# Patient Record
Sex: MALE | Race: BLACK OR AFRICAN AMERICAN | Employment: FULL TIME | URBAN - METROPOLITAN AREA
[De-identification: names, ages, dates, MRNs, and addresses within clinical notes are randomized per-mention and may not be internally consistent; named-entity substitution may affect disease eponyms.]

---

## 2022-04-05 ENCOUNTER — HOSPITAL ENCOUNTER (EMERGENCY)
Age: 37
Discharge: HOME | End: 2022-04-05
Attending: EMERGENCY MEDICINE
Payer: MEDICAID

## 2022-04-05 VITALS
HEART RATE: 98 BPM | HEIGHT: 66 IN | BODY MASS INDEX: 30.53 KG/M2 | DIASTOLIC BLOOD PRESSURE: 80 MMHG | RESPIRATION RATE: 16 BRPM | WEIGHT: 190 LBS | OXYGEN SATURATION: 98 % | TEMPERATURE: 97.5 F | SYSTOLIC BLOOD PRESSURE: 130 MMHG

## 2022-04-05 DIAGNOSIS — M43.6 TORTICOLLIS: Primary | ICD-10-CM

## 2022-04-05 PROCEDURE — 99283 EMERGENCY DEPT VISIT LOW MDM: CPT

## 2022-04-05 ASSESSMENT — ENCOUNTER SYMPTOMS
DIARRHEA: 0
WHEEZING: 0
EYE DISCHARGE: 0
ABDOMINAL PAIN: 0
NAUSEA: 0
SORE THROAT: 0
SHORTNESS OF BREATH: 0
SINUS PRESSURE: 0
EYE REDNESS: 0
VOMITING: 0
BACK PAIN: 0
COUGH: 0
EYE PAIN: 0

## 2022-04-05 NOTE — Clinical Note
Janny Goetz was seen and treated in our emergency department on 4/5/2022. He may return to work on 04/06/2022. If you have any questions or concerns, please don't hesitate to call.       Fernanda Allison, DO

## 2022-04-05 NOTE — Clinical Note
Deepthi Tay was seen and treated in our emergency department on 4/5/2022. He may return to work on 04/06/2022. If you have any questions or concerns, please don't hesitate to call.       Felix Lino, DO

## 2022-04-06 NOTE — ED PROVIDER NOTES
Sleeping with fan and window, waking up with stiff neck in am; no trauma    The history is provided by the patient. Neck Pain  Pain location:  R side  Quality:  Burning  Pain radiates to:  Does not radiate  Pain severity:  Mild  Pain is:  Unable to specify  Onset quality:  Gradual  Timing:  Intermittent  Progression:  Waxing and waning  Chronicity:  Recurrent  Relieved by:  Nothing  Worsened by:  Position and twisting  Ineffective treatments:  None tried  Associated symptoms: no chest pain, no fever, no headaches and no weakness         Review of Systems   Constitutional: Negative for chills and fever. HENT: Negative for ear pain, sinus pressure and sore throat. Eyes: Negative for pain, discharge and redness. Respiratory: Negative for cough, shortness of breath and wheezing. Cardiovascular: Negative for chest pain. Gastrointestinal: Negative for abdominal pain, diarrhea, nausea and vomiting. Genitourinary: Negative for dysuria and frequency. Musculoskeletal: Positive for myalgias and neck pain. Negative for arthralgias and back pain. Skin: Negative for rash and wound. Neurological: Negative for weakness and headaches. Hematological: Negative for adenopathy. Psychiatric/Behavioral: Negative. All other systems reviewed and are negative. Physical Exam  Vitals and nursing note reviewed. Constitutional:       Appearance: He is well-developed. HENT:      Head: Normocephalic and atraumatic. Eyes:      Pupils: Pupils are equal, round, and reactive to light. Cardiovascular:      Rate and Rhythm: Normal rate and regular rhythm. Heart sounds: Normal heart sounds. No murmur heard. Pulmonary:      Effort: Pulmonary effort is normal.      Breath sounds: Normal breath sounds. Abdominal:      General: Bowel sounds are normal.      Palpations: Abdomen is soft. Tenderness: There is no abdominal tenderness. There is no guarding or rebound.    Musculoskeletal:      Cervical back: Neck supple. Spasms, torticollis and tenderness present. Decreased range of motion. Skin:     General: Skin is warm and dry. Neurological:      Mental Status: He is alert and oriented to person, place, and time. Psychiatric:         Behavior: Behavior normal.         Thought Content: Thought content normal.         Judgment: Judgment normal.        --------------------------------------------- PAST HISTORY ---------------------------------------------  Past Medical History:  has no past medical history on file. Past Surgical History:  has no past surgical history on file. Social History:  reports that he has been smoking cigars. He does not have any smokeless tobacco history on file. He reports current alcohol use. He reports that he does not use drugs. Family History: family history is not on file. The patients home medications have been reviewed. Allergies: Patient has no known allergies. -------------------------------------------------- RESULTS -------------------------------------------------  No results found for this visit on 04/05/22. No orders to display       ------------------------- NURSING NOTES AND VITALS REVIEWED ---------------------------   The nursing notes within the ED encounter and vital signs as below have been reviewed. /80   Pulse 98   Temp 97.5 °F (36.4 °C) (Temporal)   Resp 16   Ht 5' 6\" (1.676 m)   Wt 190 lb (86.2 kg)   SpO2 98%   BMI 30.67 kg/m²   Oxygen Saturation Interpretation: Normal      ------------------------------------------ PROGRESS NOTES ------------------------------------------   I have spoken with the patient and discussed todays results, in addition to providing specific details for the plan of care and counseling regarding the diagnosis and prognosis.   Their questions are answered at this time and they are agreeable with the plan.      --------------------------------- ADDITIONAL PROVIDER NOTES ---------------------------------        This patient is stable for discharge. I have shared the specific conditions for return, as well as the importance of follow-up. IMPRESSION:     1.  Torticollis      Discharge Medication List as of 4/5/2022  8:24 PM      STOP taking these medications       oxyCODONE-acetaminophen (PERCOCET) 5-325 MG per tablet Comments:   Reason for Stopping:                 Procedures     MDM                   Colby Ospina DO  04/05/22 2027

## 2022-10-03 ENCOUNTER — APPOINTMENT (OUTPATIENT)
Dept: GENERAL RADIOLOGY | Age: 37
End: 2022-10-03
Payer: MEDICAID

## 2022-10-03 ENCOUNTER — APPOINTMENT (OUTPATIENT)
Dept: ULTRASOUND IMAGING | Age: 37
End: 2022-10-03
Payer: MEDICAID

## 2022-10-03 ENCOUNTER — HOSPITAL ENCOUNTER (EMERGENCY)
Age: 37
Discharge: HOME | End: 2022-10-03
Attending: EMERGENCY MEDICINE
Payer: MEDICAID

## 2022-10-03 VITALS
DIASTOLIC BLOOD PRESSURE: 86 MMHG | RESPIRATION RATE: 14 BRPM | HEART RATE: 86 BPM | OXYGEN SATURATION: 99 % | TEMPERATURE: 97.3 F | SYSTOLIC BLOOD PRESSURE: 126 MMHG

## 2022-10-03 DIAGNOSIS — M79.5: ICD-10-CM

## 2022-10-03 DIAGNOSIS — M79.89 LEG SWELLING: Primary | ICD-10-CM

## 2022-10-03 DIAGNOSIS — L03.116 CELLULITIS OF LEFT LEG: ICD-10-CM

## 2022-10-03 LAB
ANION GAP SERPL CALCULATED.3IONS-SCNC: 9 MMOL/L (ref 7–16)
BASOPHILS ABSOLUTE: 0.03 E9/L (ref 0–0.2)
BASOPHILS RELATIVE PERCENT: 0.5 % (ref 0–2)
BUN BLDV-MCNC: 13 MG/DL (ref 6–20)
CALCIUM SERPL-MCNC: 9.5 MG/DL (ref 8.6–10.2)
CHLORIDE BLD-SCNC: 104 MMOL/L (ref 98–107)
CO2: 27 MMOL/L (ref 22–29)
CREAT SERPL-MCNC: 1 MG/DL (ref 0.7–1.2)
EOSINOPHILS ABSOLUTE: 0.04 E9/L (ref 0.05–0.5)
EOSINOPHILS RELATIVE PERCENT: 0.7 % (ref 0–6)
GFR AFRICAN AMERICAN: >60
GFR NON-AFRICAN AMERICAN: >60 ML/MIN/1.73
GLUCOSE BLD-MCNC: 87 MG/DL (ref 74–99)
HCT VFR BLD CALC: 41.9 % (ref 37–54)
HEMOGLOBIN: 14 G/DL (ref 12.5–16.5)
IMMATURE GRANULOCYTES #: 0.01 E9/L
IMMATURE GRANULOCYTES %: 0.2 % (ref 0–5)
LYMPHOCYTES ABSOLUTE: 1.99 E9/L (ref 1.5–4)
LYMPHOCYTES RELATIVE PERCENT: 34 % (ref 20–42)
MCH RBC QN AUTO: 28.5 PG (ref 26–35)
MCHC RBC AUTO-ENTMCNC: 33.4 % (ref 32–34.5)
MCV RBC AUTO: 85.2 FL (ref 80–99.9)
MONOCYTES ABSOLUTE: 0.64 E9/L (ref 0.1–0.95)
MONOCYTES RELATIVE PERCENT: 10.9 % (ref 2–12)
NEUTROPHILS ABSOLUTE: 3.14 E9/L (ref 1.8–7.3)
NEUTROPHILS RELATIVE PERCENT: 53.7 % (ref 43–80)
PDW BLD-RTO: 14.6 FL (ref 11.5–15)
PLATELET # BLD: 243 E9/L (ref 130–450)
PMV BLD AUTO: 10.6 FL (ref 7–12)
POTASSIUM REFLEX MAGNESIUM: 4.3 MMOL/L (ref 3.5–5)
RBC # BLD: 4.92 E12/L (ref 3.8–5.8)
SODIUM BLD-SCNC: 140 MMOL/L (ref 132–146)
URIC ACID, SERUM: 6.7 MG/DL (ref 3.4–7)
WBC # BLD: 5.9 E9/L (ref 4.5–11.5)

## 2022-10-03 PROCEDURE — 85025 COMPLETE CBC W/AUTO DIFF WBC: CPT

## 2022-10-03 PROCEDURE — 99284 EMERGENCY DEPT VISIT MOD MDM: CPT

## 2022-10-03 PROCEDURE — 80048 BASIC METABOLIC PNL TOTAL CA: CPT

## 2022-10-03 PROCEDURE — 6370000000 HC RX 637 (ALT 250 FOR IP): Performed by: PHYSICIAN ASSISTANT

## 2022-10-03 PROCEDURE — 84550 ASSAY OF BLOOD/URIC ACID: CPT

## 2022-10-03 PROCEDURE — 73590 X-RAY EXAM OF LOWER LEG: CPT

## 2022-10-03 PROCEDURE — 93971 EXTREMITY STUDY: CPT

## 2022-10-03 RX ORDER — IBUPROFEN 800 MG/1
800 TABLET ORAL EVERY 8 HOURS PRN
Qty: 21 TABLET | Refills: 0 | Status: SHIPPED | OUTPATIENT
Start: 2022-10-03 | End: 2022-10-10

## 2022-10-03 RX ORDER — CEPHALEXIN 250 MG/1
500 CAPSULE ORAL ONCE
Status: COMPLETED | OUTPATIENT
Start: 2022-10-03 | End: 2022-10-03

## 2022-10-03 RX ORDER — CEPHALEXIN 500 MG/1
500 CAPSULE ORAL 4 TIMES DAILY
Qty: 40 CAPSULE | Refills: 0 | Status: SHIPPED | OUTPATIENT
Start: 2022-10-03 | End: 2022-10-13

## 2022-10-03 RX ORDER — IBUPROFEN 800 MG/1
800 TABLET ORAL ONCE
Status: COMPLETED | OUTPATIENT
Start: 2022-10-03 | End: 2022-10-03

## 2022-10-03 RX ADMIN — IBUPROFEN 800 MG: 800 TABLET, FILM COATED ORAL at 13:10

## 2022-10-03 RX ADMIN — CEPHALEXIN 500 MG: 250 CAPSULE ORAL at 13:10

## 2022-10-03 NOTE — Clinical Note
Jose Kramer was seen and treated in our emergency department on 10/3/2022. He may return to work on 10/05/2022. If you have any questions or concerns, please don't hesitate to call.       Mariah Bernstein, DO
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
## 2022-10-03 NOTE — Clinical Note
Hodan Costello was seen and treated in our emergency department on 10/3/2022. He may return to work on 10/05/2022. If you have any questions or concerns, please don't hesitate to call.       Daphney Serrato, DO

## 2024-12-30 ENCOUNTER — APPOINTMENT (OUTPATIENT)
Dept: GENERAL RADIOLOGY | Age: 39
DRG: 639 | End: 2024-12-30

## 2024-12-30 ENCOUNTER — HOSPITAL ENCOUNTER (INPATIENT)
Age: 39
LOS: 2 days | Discharge: HOME | DRG: 639 | End: 2025-01-02
Attending: EMERGENCY MEDICINE | Admitting: INTERNAL MEDICINE

## 2024-12-30 DIAGNOSIS — E13.10 DIABETIC KETOACIDOSIS WITHOUT COMA ASSOCIATED WITH OTHER SPECIFIED DIABETES MELLITUS (HCC): Primary | ICD-10-CM

## 2024-12-30 LAB
ALBUMIN SERPL-MCNC: 4.6 G/DL (ref 3.5–5.2)
ALP SERPL-CCNC: 155 U/L (ref 40–129)
ALT SERPL-CCNC: 23 U/L (ref 0–40)
ANION GAP SERPL CALCULATED.3IONS-SCNC: 24 MMOL/L (ref 7–16)
AST SERPL-CCNC: 17 U/L (ref 0–39)
B-OH-BUTYR SERPL-MCNC: 6.08 MMOL/L (ref 0.02–0.27)
BASOPHILS # BLD: 0.04 K/UL (ref 0–0.2)
BASOPHILS NFR BLD: 1 % (ref 0–2)
BILIRUB SERPL-MCNC: 0.2 MG/DL (ref 0–1.2)
BILIRUB UR QL STRIP: ABNORMAL
BUN SERPL-MCNC: 18 MG/DL (ref 6–20)
CALCIUM SERPL-MCNC: 10.5 MG/DL (ref 8.6–10.2)
CHLORIDE SERPL-SCNC: 88 MMOL/L (ref 98–107)
CLARITY UR: CLEAR
CO2 SERPL-SCNC: 18 MMOL/L (ref 22–29)
COLOR UR: YELLOW
CREAT SERPL-MCNC: 1.1 MG/DL (ref 0.7–1.2)
EOSINOPHIL # BLD: 0.03 K/UL (ref 0.05–0.5)
EOSINOPHILS RELATIVE PERCENT: 0 % (ref 0–6)
ERYTHROCYTE [DISTWIDTH] IN BLOOD BY AUTOMATED COUNT: 13 % (ref 11.5–15)
GFR, ESTIMATED: >90 ML/MIN/1.73M2
GLUCOSE SERPL-MCNC: 421 MG/DL (ref 74–99)
GLUCOSE UR STRIP-MCNC: >=1000 MG/DL
HCT VFR BLD AUTO: 47.4 % (ref 37–54)
HGB BLD-MCNC: 15.8 G/DL (ref 12.5–16.5)
HGB UR QL STRIP.AUTO: ABNORMAL
IMM GRANULOCYTES # BLD AUTO: <0.03 K/UL (ref 0–0.58)
IMM GRANULOCYTES NFR BLD: 0 % (ref 0–5)
KETONES UR STRIP-MCNC: >80 MG/DL
LEUKOCYTE ESTERASE UR QL STRIP: NEGATIVE
LYMPHOCYTES NFR BLD: 2.35 K/UL (ref 1.5–4)
LYMPHOCYTES RELATIVE PERCENT: 34 % (ref 20–42)
MCH RBC QN AUTO: 28.2 PG (ref 26–35)
MCHC RBC AUTO-ENTMCNC: 33.3 G/DL (ref 32–34.5)
MCV RBC AUTO: 84.5 FL (ref 80–99.9)
MONOCYTES NFR BLD: 0.64 K/UL (ref 0.1–0.95)
MONOCYTES NFR BLD: 9 % (ref 2–12)
NEUTROPHILS NFR BLD: 56 % (ref 43–80)
NEUTS SEG NFR BLD: 3.91 K/UL (ref 1.8–7.3)
NITRITE UR QL STRIP: NEGATIVE
PH UR STRIP: 5.5 [PH] (ref 5–9)
PH VENOUS: 7.26 (ref 7.35–7.45)
PLATELET # BLD AUTO: 271 K/UL (ref 130–450)
PMV BLD AUTO: 12 FL (ref 7–12)
POTASSIUM SERPL-SCNC: 4.7 MMOL/L (ref 3.5–5)
PROT SERPL-MCNC: 9.1 G/DL (ref 6.4–8.3)
PROT UR STRIP-MCNC: NEGATIVE MG/DL
RBC # BLD AUTO: 5.61 M/UL (ref 3.8–5.8)
RBC #/AREA URNS HPF: ABNORMAL /HPF
SODIUM SERPL-SCNC: 130 MMOL/L (ref 132–146)
SP GR UR STRIP: 1.02 (ref 1–1.03)
UROBILINOGEN UR STRIP-ACNC: 0.2 EU/DL (ref 0–1)
WBC #/AREA URNS HPF: ABNORMAL /HPF
WBC OTHER # BLD: 7 K/UL (ref 4.5–11.5)

## 2024-12-30 PROCEDURE — 85025 COMPLETE CBC W/AUTO DIFF WBC: CPT

## 2024-12-30 PROCEDURE — 80053 COMPREHEN METABOLIC PANEL: CPT

## 2024-12-30 PROCEDURE — 96374 THER/PROPH/DIAG INJ IV PUSH: CPT

## 2024-12-30 PROCEDURE — 82800 BLOOD PH: CPT

## 2024-12-30 PROCEDURE — 99285 EMERGENCY DEPT VISIT HI MDM: CPT

## 2024-12-30 PROCEDURE — 6370000000 HC RX 637 (ALT 250 FOR IP): Performed by: PHYSICIAN ASSISTANT

## 2024-12-30 PROCEDURE — 82010 KETONE BODYS QUAN: CPT

## 2024-12-30 PROCEDURE — 81001 URINALYSIS AUTO W/SCOPE: CPT

## 2024-12-30 PROCEDURE — 71046 X-RAY EXAM CHEST 2 VIEWS: CPT

## 2024-12-30 PROCEDURE — 2580000003 HC RX 258: Performed by: PHYSICIAN ASSISTANT

## 2024-12-30 RX ORDER — 0.9 % SODIUM CHLORIDE 0.9 %
2000 INTRAVENOUS SOLUTION INTRAVENOUS ONCE
Status: COMPLETED | OUTPATIENT
Start: 2024-12-30 | End: 2024-12-31

## 2024-12-30 RX ADMIN — INSULIN HUMAN 5 UNITS: 100 INJECTION, SOLUTION PARENTERAL at 22:59

## 2024-12-30 RX ADMIN — SODIUM CHLORIDE 2000 ML: 9 INJECTION, SOLUTION INTRAVENOUS at 23:06

## 2024-12-30 ASSESSMENT — LIFESTYLE VARIABLES
HOW MANY STANDARD DRINKS CONTAINING ALCOHOL DO YOU HAVE ON A TYPICAL DAY: 1 OR 2
HOW OFTEN DO YOU HAVE A DRINK CONTAINING ALCOHOL: MONTHLY OR LESS

## 2024-12-30 NOTE — ED PROVIDER NOTES
ED Attending  CC: Yes        HPI:  12/30/24, Time: 6:59 PM DARRYN Garcia is a 39 y.o. male presenting to the ED for elevated blood pressure, beginning about a week ago.  The complaint has been intermittent, mild in severity, and worsened by nothing. Patient provides history in the ED today. States that he has noticed his blood pressure elevated today which is what prompted ED evaluation. Denies current chest pain, shortness of breath, headache, dizziness, lightheadedness, nausea and vomiting however does report increased thirst, polydipsia, and intermittent blurry vision.  No history of hypertension. Never been on antihypertensives. Afebrile without recent travel or sick contacts. Patient denies all other symptoms at this time.     Review of Systems:   A complete review of systems was performed and pertinent positives and negatives are stated within HPI, all other systems reviewed and are negative.          --------------------------------------------- PAST HISTORY ---------------------------------------------  Past Medical History:  has no past medical history on file.    Past Surgical History:  has no past surgical history on file.    Social History:  reports that he has been smoking cigars. He does not have any smokeless tobacco history on file. He reports current alcohol use. He reports that he does not use drugs.    Family History: family history is not on file.     The patient’s home medications have been reviewed.    Allergies: Patient has no known allergies.    -------------------------------------------------- RESULTS -------------------------------------------------  All laboratory and radiology results have been personally reviewed by myself   LABS:  Results for orders placed or performed during the hospital encounter of 12/30/24   CBC with Auto Differential   Result Value Ref Range    WBC 7.0 4.5 - 11.5 k/uL    RBC 5.61 3.80 - 5.80 m/uL    Hemoglobin 15.8 12.5 - 16.5 g/dL    Hematocrit 47.4 
DISPLAY PLAN FREE TEXT

## 2024-12-31 PROBLEM — E11.10 DKA, TYPE 2, NOT AT GOAL (HCC): Status: ACTIVE | Noted: 2024-12-31

## 2024-12-31 PROBLEM — E13.649: Status: ACTIVE | Noted: 2024-12-31

## 2024-12-31 LAB
ANION GAP SERPL CALCULATED.3IONS-SCNC: 18 MMOL/L (ref 7–16)
BUN SERPL-MCNC: 14 MG/DL (ref 6–20)
CALCIUM SERPL-MCNC: 9 MG/DL (ref 8.6–10.2)
CHLORIDE SERPL-SCNC: 93 MMOL/L (ref 98–107)
CO2 SERPL-SCNC: 19 MMOL/L (ref 22–29)
CREAT SERPL-MCNC: 1 MG/DL (ref 0.7–1.2)
GFR, ESTIMATED: >90 ML/MIN/1.73M2
GLUCOSE BLD-MCNC: 218 MG/DL (ref 74–99)
GLUCOSE BLD-MCNC: 219 MG/DL (ref 74–99)
GLUCOSE BLD-MCNC: 255 MG/DL (ref 74–99)
GLUCOSE BLD-MCNC: 310 MG/DL (ref 74–99)
GLUCOSE BLD-MCNC: 357 MG/DL (ref 74–99)
GLUCOSE BLD-MCNC: 421 MG/DL (ref 74–99)
GLUCOSE BLD-MCNC: 453 MG/DL (ref 74–99)
GLUCOSE BLD-MCNC: 472 MG/DL (ref 74–99)
GLUCOSE SERPL-MCNC: 393 MG/DL (ref 74–99)
HBA1C MFR BLD: 14.7 % (ref 4–5.6)
PH VENOUS: 7.24 (ref 7.35–7.45)
POTASSIUM SERPL-SCNC: 4.6 MMOL/L (ref 3.5–5)
SODIUM SERPL-SCNC: 130 MMOL/L (ref 132–146)

## 2024-12-31 PROCEDURE — 6370000000 HC RX 637 (ALT 250 FOR IP): Performed by: INTERNAL MEDICINE

## 2024-12-31 PROCEDURE — 82800 BLOOD PH: CPT

## 2024-12-31 PROCEDURE — 83036 HEMOGLOBIN GLYCOSYLATED A1C: CPT

## 2024-12-31 PROCEDURE — 99232 SBSQ HOSP IP/OBS MODERATE 35: CPT | Performed by: STUDENT IN AN ORGANIZED HEALTH CARE EDUCATION/TRAINING PROGRAM

## 2024-12-31 PROCEDURE — 82962 GLUCOSE BLOOD TEST: CPT

## 2024-12-31 PROCEDURE — 99222 1ST HOSP IP/OBS MODERATE 55: CPT | Performed by: INTERNAL MEDICINE

## 2024-12-31 PROCEDURE — 6360000002 HC RX W HCPCS: Performed by: INTERNAL MEDICINE

## 2024-12-31 PROCEDURE — 2580000003 HC RX 258: Performed by: INTERNAL MEDICINE

## 2024-12-31 PROCEDURE — 80048 BASIC METABOLIC PNL TOTAL CA: CPT

## 2024-12-31 PROCEDURE — 1200000000 HC SEMI PRIVATE

## 2024-12-31 PROCEDURE — 6370000000 HC RX 637 (ALT 250 FOR IP): Performed by: HOSPITALIST

## 2024-12-31 RX ORDER — ENOXAPARIN SODIUM 100 MG/ML
40 INJECTION SUBCUTANEOUS DAILY
Status: DISCONTINUED | OUTPATIENT
Start: 2024-12-31 | End: 2025-01-02 | Stop reason: HOSPADM

## 2024-12-31 RX ORDER — INSULIN LISPRO 100 [IU]/ML
0-4 INJECTION, SOLUTION INTRAVENOUS; SUBCUTANEOUS
Status: DISCONTINUED | OUTPATIENT
Start: 2024-12-31 | End: 2025-01-02 | Stop reason: HOSPADM

## 2024-12-31 RX ORDER — ONDANSETRON 2 MG/ML
4 INJECTION INTRAMUSCULAR; INTRAVENOUS EVERY 6 HOURS PRN
Status: DISCONTINUED | OUTPATIENT
Start: 2024-12-31 | End: 2025-01-02 | Stop reason: HOSPADM

## 2024-12-31 RX ORDER — DEXTROSE MONOHYDRATE 100 MG/ML
INJECTION, SOLUTION INTRAVENOUS CONTINUOUS PRN
Status: DISCONTINUED | OUTPATIENT
Start: 2024-12-31 | End: 2025-01-02 | Stop reason: HOSPADM

## 2024-12-31 RX ORDER — SODIUM CHLORIDE 9 MG/ML
INJECTION, SOLUTION INTRAVENOUS CONTINUOUS
Status: DISCONTINUED | OUTPATIENT
Start: 2024-12-31 | End: 2025-01-02 | Stop reason: HOSPADM

## 2024-12-31 RX ORDER — SODIUM CHLORIDE 0.9 % (FLUSH) 0.9 %
10 SYRINGE (ML) INJECTION EVERY 12 HOURS SCHEDULED
Status: DISCONTINUED | OUTPATIENT
Start: 2024-12-31 | End: 2025-01-02 | Stop reason: HOSPADM

## 2024-12-31 RX ORDER — SODIUM CHLORIDE 0.9 % (FLUSH) 0.9 %
10 SYRINGE (ML) INJECTION PRN
Status: DISCONTINUED | OUTPATIENT
Start: 2024-12-31 | End: 2025-01-02 | Stop reason: HOSPADM

## 2024-12-31 RX ORDER — ACETAMINOPHEN 650 MG/1
650 SUPPOSITORY RECTAL EVERY 6 HOURS PRN
Status: DISCONTINUED | OUTPATIENT
Start: 2024-12-31 | End: 2025-01-02 | Stop reason: HOSPADM

## 2024-12-31 RX ORDER — POLYETHYLENE GLYCOL 3350 17 G/17G
17 POWDER, FOR SOLUTION ORAL DAILY PRN
Status: DISCONTINUED | OUTPATIENT
Start: 2024-12-31 | End: 2025-01-02 | Stop reason: HOSPADM

## 2024-12-31 RX ORDER — PROMETHAZINE HYDROCHLORIDE 25 MG/1
12.5 TABLET ORAL EVERY 6 HOURS PRN
Status: DISCONTINUED | OUTPATIENT
Start: 2024-12-31 | End: 2025-01-02 | Stop reason: HOSPADM

## 2024-12-31 RX ORDER — INSULIN GLARGINE 100 [IU]/ML
20 INJECTION, SOLUTION SUBCUTANEOUS NIGHTLY
Status: DISCONTINUED | OUTPATIENT
Start: 2024-12-31 | End: 2025-01-02 | Stop reason: HOSPADM

## 2024-12-31 RX ORDER — GLUCAGON 1 MG/ML
1 KIT INJECTION PRN
Status: DISCONTINUED | OUTPATIENT
Start: 2024-12-31 | End: 2025-01-02 | Stop reason: HOSPADM

## 2024-12-31 RX ORDER — INSULIN LISPRO 100 [IU]/ML
4 INJECTION, SOLUTION INTRAVENOUS; SUBCUTANEOUS ONCE
Status: COMPLETED | OUTPATIENT
Start: 2024-12-31 | End: 2024-12-31

## 2024-12-31 RX ORDER — SODIUM CHLORIDE 9 MG/ML
INJECTION, SOLUTION INTRAVENOUS PRN
Status: DISCONTINUED | OUTPATIENT
Start: 2024-12-31 | End: 2025-01-02 | Stop reason: HOSPADM

## 2024-12-31 RX ORDER — ACETAMINOPHEN 325 MG/1
650 TABLET ORAL EVERY 6 HOURS PRN
Status: DISCONTINUED | OUTPATIENT
Start: 2024-12-31 | End: 2025-01-02 | Stop reason: HOSPADM

## 2024-12-31 RX ADMIN — INSULIN LISPRO 1 UNITS: 100 INJECTION, SOLUTION INTRAVENOUS; SUBCUTANEOUS at 18:09

## 2024-12-31 RX ADMIN — INSULIN LISPRO 4 UNITS: 100 INJECTION, SOLUTION INTRAVENOUS; SUBCUTANEOUS at 23:09

## 2024-12-31 RX ADMIN — INSULIN LISPRO 3 UNITS: 100 INJECTION, SOLUTION INTRAVENOUS; SUBCUTANEOUS at 03:12

## 2024-12-31 RX ADMIN — INSULIN LISPRO 2 UNITS: 100 INJECTION, SOLUTION INTRAVENOUS; SUBCUTANEOUS at 13:11

## 2024-12-31 RX ADMIN — ENOXAPARIN SODIUM 40 MG: 100 INJECTION SUBCUTANEOUS at 08:39

## 2024-12-31 RX ADMIN — INSULIN LISPRO 4 UNITS: 100 INJECTION, SOLUTION INTRAVENOUS; SUBCUTANEOUS at 20:48

## 2024-12-31 RX ADMIN — SODIUM CHLORIDE: 9 INJECTION, SOLUTION INTRAVENOUS at 03:08

## 2024-12-31 RX ADMIN — INSULIN GLARGINE 20 UNITS: 100 INJECTION, SOLUTION SUBCUTANEOUS at 03:11

## 2024-12-31 RX ADMIN — INSULIN LISPRO 1 UNITS: 100 INJECTION, SOLUTION INTRAVENOUS; SUBCUTANEOUS at 08:39

## 2024-12-31 ASSESSMENT — PAIN - FUNCTIONAL ASSESSMENT: PAIN_FUNCTIONAL_ASSESSMENT: NONE - DENIES PAIN

## 2024-12-31 NOTE — H&P
[facility]   HISTORY AND PHYSICAL EXAM      AUTHOR: Flakito Covington MD PATIENT NAME: Arpan Garcia   PCP: No primary care provider on file.  MRN: [id number], : 1985       CHIEF COMPLAINT / REASON FOR ADMISSION: Polyuria, polydipsia, dehydration  HPI:   This is a 39 y.o. male  has no past medical history on file. presented with Polyuria, polydipsia, dehydration  for last few days prior to arrival to the hospital.   Now feels dehydrated and fatigue. The patient was seen and examined at bedside, appears alert and awake with no acute distress and is able to answer simple  questions. On direct questioning, patient denied any  resting ongoing chest pain, resting SOB, hemoptysis, productive cough, fever, ongoing palpitation, active abdominal pain, hematemesis, rectal bleeding, rodney, hematuria, any other  and GI complaints, and any new focal neuro deficits .    ROS:  Pertinent positives and negatives are noted in the HPI, all other systems are reviewed and negative    PMH:  No past medical history on file.    Surgical History:  No past surgical history on file.    Medications Prior to Admission:    Prior to Admission medications    Not on File       Allergies:    Patient has no known allergies.    Social History:    reports that he has been smoking cigars. He does not have any smokeless tobacco history on file. He reports current alcohol use. He reports that he does not use drugs.      Family History:   family history is not on file.      PHYSICAL EXAM:  Vitals:  /82   Pulse 77   Temp 98.6 °F (37 °C)   Resp 17   Ht 1.676 m (5' 5.98\")   Wt 88 kg (194 lb)   SpO2 99%   BMI 31.33 kg/m²   GENERAL: No acute distress, Alert and awake, Afebrile   HEENT: PERRLA, no icterus. OP clear and no exudates .  NECK: Supple  no carotid/ophthalmic bruits, JVD None.  RESPIRATORY:  Bilateral equal vesicular breath sound with no wheezing. Lung bases are clear.    HEART: Normal S1 and S2,

## 2024-12-31 NOTE — CARE COORDINATION
Case Management Assessment  Initial Evaluation    Date/Time of Evaluation: 12/31/2024 4:38 PM  Assessment Completed by: GENO Harmon    If patient is discharged prior to next notation, then this note serves as note for discharge by case management.    Patient Name: Arpan Garcia                   YOB: 1985  Diagnosis: DKA, type 2, not at goal (HCC) [E11.10]  Uncontrolled diabetes mellitus of other type with hypoglycemia, unspecified hypoglycemia coma status (HCC) [E13.649]                   Date / Time: 12/30/2024  6:40 PM    Patient Admission Status: Inpatient   Readmission Risk (Low < 19, Mod (19-27), High > 27): Readmission Risk Score: 5.1    Current PCP: No primary care provider on file.  PCP verified by CM? No    Chart Reviewed: Yes      History Provided by: Patient, Medical Record  Patient Orientation: Alert and Oriented, Person, Place, Situation, Self    Patient Cognition: Alert    Hospitalization in the last 30 days (Readmission):  No    If yes, Readmission Assessment in CM Navigator will be completed.    Advance Directives:      Code Status: Full Code   Patient's Primary Decision Maker is: Named in Scanned ACP Document      Discharge Planning:    Patient lives with:   Type of Home:    Primary Care Giver: Self  Patient Support Systems include: Spouse/Significant Other   Current Financial resources:    Current community resources:    Current services prior to admission:              Current DME:              Type of Home Care services:       ADLS  Prior functional level: Independent in ADLs/IADLs  Current functional level: Independent in ADLs/IADLs    PT AM-PAC:   /24  OT AM-PAC:   /24    Family can provide assistance at DC: No  Would you like Case Management to discuss the discharge plan with any other family members/significant others, and if so, who? Yes (HEATHER Rodriguez)  Plans to Return to Present Housing: Yes  Other Identified Issues/Barriers to RETURNING to current

## 2024-12-31 NOTE — PROGRESS NOTES
[facility] Hospitalist Progress Note    Admitting Date and Time: 12/30/2024  6:40 PM  Admit Dx: DKA, type 2, not at goal (HCC) [E11.10]  Uncontrolled diabetes mellitus of other type with hypoglycemia, unspecified hypoglycemia coma status (HCC) [E13.649]    Subjective:  Patient is being followed for DKA, type 2, not at goal (HCC) [E11.10]  Uncontrolled diabetes mellitus of other type with hypoglycemia, unspecified hypoglycemia coma status (HCC) [E13.649]     No acute events overnight.  Patient's blood glucose is well-controlled now.    ROS: denies fever, chills, cp, sob, n/v, HA unless stated above.      insulin lispro  0-4 Units SubCUTAneous 4x Daily AC & HS    sodium chloride flush  10 mL IntraVENous 2 times per day    enoxaparin  40 mg SubCUTAneous Daily    insulin glargine  20 Units SubCUTAneous Nightly     glucose, 4 tablet, PRN  dextrose bolus, 125 mL, PRN   Or  dextrose bolus, 250 mL, PRN  glucagon (rDNA), 1 mg, PRN  dextrose, , Continuous PRN  sodium chloride flush, 10 mL, PRN  sodium chloride, , PRN  promethazine, 12.5 mg, Q6H PRN   Or  ondansetron, 4 mg, Q6H PRN  polyethylene glycol, 17 g, Daily PRN  acetaminophen, 650 mg, Q6H PRN   Or  acetaminophen, 650 mg, Q6H PRN         Objective:    /88   Pulse 81   Temp 97.9 °F (36.6 °C) (Oral)   Resp 16   Ht 1.676 m (5' 5.98\")   Wt 88 kg (194 lb)   SpO2 95%   BMI 31.33 kg/m²     General Appearance: alert and oriented to person, place and time and in no acute distress  Skin: warm and dry  Head: normocephalic and atraumatic  Eyes: pupils equal, round, and reactive to light, extraocular eye movements intact, conjunctivae normal  Neck: neck supple and non tender without mass   Pulmonary/Chest: clear to auscultation bilaterally- no wheezes, rales or rhonchi, normal air movement, no respiratory distress  Cardiovascular: normal rate, normal S1 and S2 and no carotid bruits  Abdomen: soft, non-tender, non-distended, normal bowel sounds, no masses or

## 2024-12-31 NOTE — ACP (ADVANCE CARE PLANNING)
Advance Care Planning   Healthcare Decision Maker:    Primary Decision Maker: ROSE GODINEZ-POA - Girlfriend - [phone]    Click here to complete Healthcare Decision Makers including selection of the Healthcare Decision Maker Relationship (ie \"Primary\").  Today we documented Decision Maker(s) consistent with ACP documents on file.     SW assisted pt to complete POA reflecting above choices. Original and copy given to pt. SW had it scanned to chart.  Electronically signed by GENO Harmon on 12/31/2024 at 4:40 PM

## 2025-01-01 LAB
ANION GAP SERPL CALCULATED.3IONS-SCNC: 13 MMOL/L (ref 7–16)
BASOPHILS # BLD: 0.03 K/UL (ref 0–0.2)
BASOPHILS NFR BLD: 1 % (ref 0–2)
BUN SERPL-MCNC: 10 MG/DL (ref 6–20)
CALCIUM SERPL-MCNC: 9 MG/DL (ref 8.6–10.2)
CHLORIDE SERPL-SCNC: 100 MMOL/L (ref 98–107)
CO2 SERPL-SCNC: 21 MMOL/L (ref 22–29)
CREAT SERPL-MCNC: 0.9 MG/DL (ref 0.7–1.2)
EOSINOPHIL # BLD: 0.04 K/UL (ref 0.05–0.5)
EOSINOPHILS RELATIVE PERCENT: 1 % (ref 0–6)
ERYTHROCYTE [DISTWIDTH] IN BLOOD BY AUTOMATED COUNT: 13.1 % (ref 11.5–15)
GFR, ESTIMATED: >90 ML/MIN/1.73M2
GLUCOSE BLD-MCNC: 236 MG/DL (ref 74–99)
GLUCOSE BLD-MCNC: 244 MG/DL (ref 74–99)
GLUCOSE BLD-MCNC: 281 MG/DL (ref 74–99)
GLUCOSE BLD-MCNC: 296 MG/DL (ref 74–99)
GLUCOSE BLD-MCNC: 308 MG/DL (ref 74–99)
GLUCOSE BLD-MCNC: 374 MG/DL (ref 74–99)
GLUCOSE SERPL-MCNC: 316 MG/DL (ref 74–99)
HCT VFR BLD AUTO: 37.5 % (ref 37–54)
HGB BLD-MCNC: 12.9 G/DL (ref 12.5–16.5)
IMM GRANULOCYTES # BLD AUTO: <0.03 K/UL (ref 0–0.58)
IMM GRANULOCYTES NFR BLD: 0 % (ref 0–5)
LYMPHOCYTES NFR BLD: 1.93 K/UL (ref 1.5–4)
LYMPHOCYTES RELATIVE PERCENT: 45 % (ref 20–42)
MCH RBC QN AUTO: 28.5 PG (ref 26–35)
MCHC RBC AUTO-ENTMCNC: 34.4 G/DL (ref 32–34.5)
MCV RBC AUTO: 82.8 FL (ref 80–99.9)
MONOCYTES NFR BLD: 0.41 K/UL (ref 0.1–0.95)
MONOCYTES NFR BLD: 10 % (ref 2–12)
NEUTROPHILS NFR BLD: 43 % (ref 43–80)
NEUTS SEG NFR BLD: 1.83 K/UL (ref 1.8–7.3)
PLATELET # BLD AUTO: 185 K/UL (ref 130–450)
PMV BLD AUTO: 12.3 FL (ref 7–12)
POTASSIUM SERPL-SCNC: 3.5 MMOL/L (ref 3.5–5)
RBC # BLD AUTO: 4.53 M/UL (ref 3.8–5.8)
SODIUM SERPL-SCNC: 134 MMOL/L (ref 132–146)
WBC OTHER # BLD: 4.3 K/UL (ref 4.5–11.5)

## 2025-01-01 PROCEDURE — 2500000003 HC RX 250 WO HCPCS: Performed by: INTERNAL MEDICINE

## 2025-01-01 PROCEDURE — 80048 BASIC METABOLIC PNL TOTAL CA: CPT

## 2025-01-01 PROCEDURE — 36415 COLL VENOUS BLD VENIPUNCTURE: CPT

## 2025-01-01 PROCEDURE — 85025 COMPLETE CBC W/AUTO DIFF WBC: CPT

## 2025-01-01 PROCEDURE — 6370000000 HC RX 637 (ALT 250 FOR IP): Performed by: STUDENT IN AN ORGANIZED HEALTH CARE EDUCATION/TRAINING PROGRAM

## 2025-01-01 PROCEDURE — 2580000003 HC RX 258: Performed by: INTERNAL MEDICINE

## 2025-01-01 PROCEDURE — 6370000000 HC RX 637 (ALT 250 FOR IP): Performed by: INTERNAL MEDICINE

## 2025-01-01 PROCEDURE — 99232 SBSQ HOSP IP/OBS MODERATE 35: CPT | Performed by: STUDENT IN AN ORGANIZED HEALTH CARE EDUCATION/TRAINING PROGRAM

## 2025-01-01 PROCEDURE — 6360000002 HC RX W HCPCS: Performed by: INTERNAL MEDICINE

## 2025-01-01 PROCEDURE — 82962 GLUCOSE BLOOD TEST: CPT

## 2025-01-01 PROCEDURE — 1200000000 HC SEMI PRIVATE

## 2025-01-01 RX ORDER — INSULIN LISPRO 100 [IU]/ML
6 INJECTION, SOLUTION INTRAVENOUS; SUBCUTANEOUS
Status: DISCONTINUED | OUTPATIENT
Start: 2025-01-01 | End: 2025-01-02 | Stop reason: HOSPADM

## 2025-01-01 RX ADMIN — ENOXAPARIN SODIUM 40 MG: 100 INJECTION SUBCUTANEOUS at 09:01

## 2025-01-01 RX ADMIN — INSULIN LISPRO 6 UNITS: 100 INJECTION, SOLUTION INTRAVENOUS; SUBCUTANEOUS at 12:26

## 2025-01-01 RX ADMIN — INSULIN LISPRO 3 UNITS: 100 INJECTION, SOLUTION INTRAVENOUS; SUBCUTANEOUS at 09:00

## 2025-01-01 RX ADMIN — INSULIN LISPRO 1 UNITS: 100 INJECTION, SOLUTION INTRAVENOUS; SUBCUTANEOUS at 20:56

## 2025-01-01 RX ADMIN — INSULIN GLARGINE 20 UNITS: 100 INJECTION, SOLUTION SUBCUTANEOUS at 20:52

## 2025-01-01 RX ADMIN — INSULIN LISPRO 2 UNITS: 100 INJECTION, SOLUTION INTRAVENOUS; SUBCUTANEOUS at 17:52

## 2025-01-01 RX ADMIN — SODIUM CHLORIDE: 9 INJECTION, SOLUTION INTRAVENOUS at 19:29

## 2025-01-01 RX ADMIN — INSULIN LISPRO 2 UNITS: 100 INJECTION, SOLUTION INTRAVENOUS; SUBCUTANEOUS at 12:26

## 2025-01-01 RX ADMIN — INSULIN LISPRO 6 UNITS: 100 INJECTION, SOLUTION INTRAVENOUS; SUBCUTANEOUS at 17:52

## 2025-01-01 RX ADMIN — SODIUM CHLORIDE, PRESERVATIVE FREE 10 ML: 5 INJECTION INTRAVENOUS at 20:58

## 2025-01-01 RX ADMIN — SODIUM CHLORIDE: 9 INJECTION, SOLUTION INTRAVENOUS at 04:25

## 2025-01-01 RX ADMIN — INSULIN LISPRO 6 UNITS: 100 INJECTION, SOLUTION INTRAVENOUS; SUBCUTANEOUS at 09:01

## 2025-01-01 NOTE — PLAN OF CARE
Problem: Chronic Conditions and Co-morbidities  Goal: Patient's chronic conditions and co-morbidity symptoms are monitored and maintained or improved  1/1/2025 0106 by Yumiko Enriquez RN  Outcome: Progressing  12/31/2024 1902 by Elida Gr RN  Outcome: Progressing     Problem: Discharge Planning  Goal: Discharge to home or other facility with appropriate resources  1/1/2025 0106 by Yumiko Enriquez RN  Outcome: Progressing  12/31/2024 1902 by Elida Gr, RN  Outcome: Progressing

## 2025-01-01 NOTE — PROGRESS NOTES
[facility] Hospitalist Progress Note    Admitting Date and Time: 12/30/2024  6:40 PM  Admit Dx: DKA, type 2, not at goal (HCC) [E11.10]  Diabetic ketoacidosis without coma associated with other specified diabetes mellitus (HCC) [E13.10]  Uncontrolled diabetes mellitus of other type with hypoglycemia, unspecified hypoglycemia coma status (HCC) [E13.649]    Subjective:  Patient is being followed for DKA, type 2, not at goal (HCC) [E11.10]  Diabetic ketoacidosis without coma associated with other specified diabetes mellitus (HCC) [E13.10]  Uncontrolled diabetes mellitus of other type with hypoglycemia, unspecified hypoglycemia coma status (HCC) [E13.649]     No acute events overnight.  Patient's blood glucose around 300 and will add Premeal insulin regimen beside the basal as well as sliding scale for better blood glucose control.    ROS: denies fever, chills, cp, sob, n/v, HA unless stated above.      insulin lispro  6 Units SubCUTAneous TID WC    insulin lispro  0-4 Units SubCUTAneous 4x Daily AC & HS    sodium chloride flush  10 mL IntraVENous 2 times per day    enoxaparin  40 mg SubCUTAneous Daily    insulin glargine  20 Units SubCUTAneous Nightly     glucose, 4 tablet, PRN  dextrose bolus, 125 mL, PRN   Or  dextrose bolus, 250 mL, PRN  glucagon (rDNA), 1 mg, PRN  dextrose, , Continuous PRN  sodium chloride flush, 10 mL, PRN  sodium chloride, , PRN  promethazine, 12.5 mg, Q6H PRN   Or  ondansetron, 4 mg, Q6H PRN  polyethylene glycol, 17 g, Daily PRN  acetaminophen, 650 mg, Q6H PRN   Or  acetaminophen, 650 mg, Q6H PRN         Objective:    BP (!) 153/83   Pulse 85   Temp 97.5 °F (36.4 °C) (Temporal)   Resp 19   Ht 1.676 m (5' 5.98\")   Wt 88 kg (194 lb)   SpO2 100%   BMI 31.33 kg/m²     General Appearance: alert and oriented to person, place and time and in no acute distress  Skin: warm and dry  Head: normocephalic and atraumatic  Eyes: pupils equal, round, and reactive to light, extraocular eye

## 2025-01-02 VITALS
TEMPERATURE: 97.9 F | HEART RATE: 81 BPM | RESPIRATION RATE: 18 BRPM | HEIGHT: 66 IN | OXYGEN SATURATION: 100 % | DIASTOLIC BLOOD PRESSURE: 93 MMHG | BODY MASS INDEX: 31.18 KG/M2 | SYSTOLIC BLOOD PRESSURE: 136 MMHG | WEIGHT: 194 LBS

## 2025-01-02 LAB
GLUCOSE BLD-MCNC: 198 MG/DL (ref 74–99)
GLUCOSE BLD-MCNC: 213 MG/DL (ref 74–99)

## 2025-01-02 PROCEDURE — 99239 HOSP IP/OBS DSCHRG MGMT >30: CPT | Performed by: STUDENT IN AN ORGANIZED HEALTH CARE EDUCATION/TRAINING PROGRAM

## 2025-01-02 PROCEDURE — 6370000000 HC RX 637 (ALT 250 FOR IP): Performed by: INTERNAL MEDICINE

## 2025-01-02 PROCEDURE — 2580000003 HC RX 258: Performed by: INTERNAL MEDICINE

## 2025-01-02 PROCEDURE — 6370000000 HC RX 637 (ALT 250 FOR IP): Performed by: STUDENT IN AN ORGANIZED HEALTH CARE EDUCATION/TRAINING PROGRAM

## 2025-01-02 PROCEDURE — 6360000002 HC RX W HCPCS: Performed by: INTERNAL MEDICINE

## 2025-01-02 PROCEDURE — 82962 GLUCOSE BLOOD TEST: CPT

## 2025-01-02 RX ORDER — INSULIN GLARGINE 100 [IU]/ML
20 INJECTION, SOLUTION SUBCUTANEOUS NIGHTLY
Qty: 10 ML | Refills: 3 | Status: SHIPPED | OUTPATIENT
Start: 2025-01-02

## 2025-01-02 RX ORDER — INSULIN LISPRO 100 [IU]/ML
0-4 INJECTION, SOLUTION INTRAVENOUS; SUBCUTANEOUS
Qty: 2 EACH | Refills: 2 | Status: SHIPPED | OUTPATIENT
Start: 2025-01-02

## 2025-01-02 RX ORDER — INSULIN LISPRO 100 [IU]/ML
6 INJECTION, SOLUTION INTRAVENOUS; SUBCUTANEOUS
Qty: 2 EACH | Refills: 2 | Status: SHIPPED | OUTPATIENT
Start: 2025-01-02

## 2025-01-02 RX ADMIN — INSULIN LISPRO 6 UNITS: 100 INJECTION, SOLUTION INTRAVENOUS; SUBCUTANEOUS at 08:33

## 2025-01-02 RX ADMIN — INSULIN LISPRO 1 UNITS: 100 INJECTION, SOLUTION INTRAVENOUS; SUBCUTANEOUS at 08:33

## 2025-01-02 RX ADMIN — ENOXAPARIN SODIUM 40 MG: 100 INJECTION SUBCUTANEOUS at 08:32

## 2025-01-02 RX ADMIN — SODIUM CHLORIDE: 9 INJECTION, SOLUTION INTRAVENOUS at 07:16

## 2025-01-02 RX ADMIN — INSULIN LISPRO 6 UNITS: 100 INJECTION, SOLUTION INTRAVENOUS; SUBCUTANEOUS at 12:27

## 2025-01-02 RX ADMIN — INSULIN LISPRO 1 UNITS: 100 INJECTION, SOLUTION INTRAVENOUS; SUBCUTANEOUS at 12:27

## 2025-01-02 ASSESSMENT — PAIN SCALES - GENERAL: PAINLEVEL_OUTOF10: 0

## 2025-01-02 NOTE — PROGRESS NOTES
Discharge instructions (AVS) reviewed in their entirety with patient and his significant other at bedside. They verbalized understanding of all education. Per outpatient pharmacy, patient will be provided vials of insulin. Verbal education and demonstration provided on how to draw up insulin. Demonstrated how to administer insulin. Patient verbalized understanding, significant other at bedside also verbalized understanding and states she can help if needed because she is a medical assistant.  Educated on all insulin orders as prescribed. Educated on importance of establishing a PCP for diabetes management and that follow-up with endocrinologist is needed. Patient verbalized understanding. Patient states he will go to pharmacy to  new scripts. Verbalized understanding that he needs to purchase a glucometer to check blood sugars 4 times/day.    Patient states he has all belongings at discharge, including: clothing, shoes, cell phone, , and overnight bag.    Electronically signed by Jennifer Grove RN on 1/2/2025 at 2:54 PM

## 2025-01-02 NOTE — PROGRESS NOTES
Reason for consult:  Newly dx    A1C:         14.7    Patient states the following concerns/barriers to diabetes self-management:     [] None       [x] Medication cost   [] Food cost/availability        [] Reading  [] Hearing   [] Vision                [] Work    [] Transportation  [x] No insurance  [] Physical limitations    [] Other:    Patient states the following about their diabetes/health:   Very pleasant, wife present. States he has noticed a decrease in energy over the last few weeks.  Admits to excessive thirst and urination as well as dry skin.  Grazes all day and eats dinner. Drinks mainly water and SF beverages.  Father is diabetic and he has some knowledge of care.      Diabetes survival packet provided to:   [x] Patient     [] Other:    Information given:   Definition of diabetes   Target glucose ranges/A1C   Self-monitoring of blood glucose   Prevention/symptoms/treatment of hypo-/hyperglycemia   Medication adherence             The plate method/meal planning guidelines             The benefits of exercise and recommendations             Reducing the risk of chronic complications     Diabetes medications reviewed (use, purpose, action): Chart reviewed. Education provided regarding current basal/bolus regimen of Lantus and Humalog including differences in types of insulin, timing with meals, onset, duration of effect and peak of insulin dose. Hypoglycemia signs, symptoms and treatments reviewed and literature provided.   Patient verbalized understanding of site rotation, storage and proper sharps disposal. He is willing to give himself an injection   Before discharge. Nursing notified.    Instructed patient to always seek guidance from their PCP for any adjustment.    Floor nursing should continue to have patient practice insulin self-injection when insulin dose is due and document in the progress notes or document refusals of insulin self-injection practice.         Post-education Assessment  [x]

## 2025-01-02 NOTE — CARE COORDINATION
1/2/25  Note: Pt discharging home with sig other today. Met with pt & sig other at bedside. Pt new Type 2 diabetic & awaiting diabetic education consult prior to d/c. Pt does not have insurance. Pt has new order for insulin: humalog & lantus. Spoke with Claudia/[facility] outpatient pharmacy, coupon applied to Humalog cost $35.00. Exploring coupon for lantus order & to update pt of cost once determined. Per inquiry Claudia recommended pt to go to [facility] for BS monitor/test strips for cost effectiveness. Updated pt & sig other. Pt on room air. Pt declined further d/c needs & reported that his car is in the parking lot & plans to drive himself home reporting that he lives 5 mins from hospital. Electronically signed by GENO Blackmon on 1/2/2025 at 12:47 PM    Addendum: Followed up with Claudia/[facility]. Notified that coupons have been applied to both Humalog & Lantus @ $35 each & syringes are $16.99, she called pt & provided update. Followed up with pt & he confirmed awareness of med/supply cost & declined any issues with covering cost. No further d/c needs stated. Updated nurse that pt expressed readiness for discharge. Electronically signed by GENO Blackmon on 1/2/2025 at 2:32 PM

## 2025-01-02 NOTE — PROGRESS NOTES
0831: Offered education on how to use glucometer to check blood glucose levels and offered education on how to administer insulin injections. Patient declined at this time. Offered to observe him perform these tasks so that it can be confirmed that he knows how to perform correctly, patient refused at this time. He states his dad had diabetes and used insulin, so he is knowledgeable in diabetes care.    1225: Patient verbalized and demonstrated understanding on how to use glucometer. Patient verbalized understanding of education on how to administer insulin injection, he again declined to demonstrate understanding.    Electronically signed by Jennifer Grove RN on 1/2/2025 at 12:32 PM

## 2025-01-02 NOTE — PLAN OF CARE
Problem: Chronic Conditions and Co-morbidities  Goal: Patient's chronic conditions and co-morbidity symptoms are monitored and maintained or improved  Outcome: Progressing  Flowsheets (Taken 1/1/2025 2045)  Care Plan - Patient's Chronic Conditions and Co-Morbidity Symptoms are Monitored and Maintained or Improved: Monitor and assess patient's chronic conditions and comorbid symptoms for stability, deterioration, or improvement

## 2025-01-02 NOTE — DISCHARGE SUMMARY
neck supple and non tender without mass   Pulmonary/Chest: clear to auscultation bilaterally- no wheezes, rales or rhonchi, normal air movement, no respiratory distress  Cardiovascular: normal rate, normal S1 and S2 and no carotid bruits  Abdomen: soft, non-tender, non-distended, normal bowel sounds, no masses or organomegaly  Extremities: no cyanosis, no clubbing and no edema  Neurologic: no cranial nerve deficit and speech normal    No intake/output data recorded.  I/O this shift:  In: 445 [P.O.:120; I.V.:325]  Out: -       LABS:  Recent Labs     12/30/24 1918 12/31/24  0015 01/01/25  1059   * 130* 134   K 4.7 4.6 3.5   CL 88* 93* 100   CO2 18* 19* 21*   BUN 18 14 10   CREATININE 1.1 1.0 0.9   GLUCOSE 421* 393* 316*   CALCIUM 10.5* 9.0 9.0       Recent Labs     12/30/24 1918 01/01/25  1100   WBC 7.0 4.3*   RBC 5.61 4.53   HGB 15.8 12.9   HCT 47.4 37.5   MCV 84.5 82.8   MCH 28.2 28.5   MCHC 33.3 34.4   RDW 13.0 13.1    185   MPV 12.0 12.3*       Recent Labs     01/01/25  1125 01/01/25  1641 01/01/25 2025 01/02/25  0831   POCGLU 281* 236* 244* 213*       Imaging:  XR CHEST (2 VW)    Result Date: 12/30/2024  EXAMINATION: TWO XRAY VIEWS OF THE CHEST 12/30/2024 8:46 pm COMPARISON: None. HISTORY: ORDERING SYSTEM PROVIDED HISTORY: htn TECHNOLOGIST PROVIDED HISTORY: Reason for exam:->htn FINDINGS: The lungs are without acute focal process.  There is no effusion or pneumothorax. The cardiomediastinal silhouette is without acute process. The osseous structures are without acute process.     No acute process.       Patient Instructions:      Medication List        START taking these medications      insulin glargine 100 UNIT/ML injection vial  Commonly known as: LANTUS  Inject 20 Units into the skin nightly     * insulin lispro 100 UNIT/ML Soln injection vial  Commonly known as: HUMALOG,ADMELOG  Inject 6 Units into the skin 3 times daily (with meals)     * insulin lispro 100 UNIT/ML Soln injection

## 2025-01-03 NOTE — PROGRESS NOTES
CLINICAL PHARMACY NOTE: MEDS TO BEDS    Total # of Prescriptions Filled: 3   The following medications were delivered to the patient:  Lantus 100 unit/ml  Leader insulin syr 30 g * 5/16* 0.3ml  Humalog 100 unit/ml    Additional Documentation:

## 2025-02-04 ENCOUNTER — HOSPITAL ENCOUNTER (EMERGENCY)
Age: 40
Discharge: HOME | End: 2025-02-04
Attending: FAMILY MEDICINE

## 2025-02-04 VITALS
SYSTOLIC BLOOD PRESSURE: 143 MMHG | TEMPERATURE: 98.8 F | DIASTOLIC BLOOD PRESSURE: 101 MMHG | OXYGEN SATURATION: 100 % | HEART RATE: 79 BPM | RESPIRATION RATE: 16 BRPM

## 2025-02-04 DIAGNOSIS — H10.32 ACUTE CONJUNCTIVITIS OF LEFT EYE, UNSPECIFIED ACUTE CONJUNCTIVITIS TYPE: Primary | ICD-10-CM

## 2025-02-04 PROCEDURE — 99283 EMERGENCY DEPT VISIT LOW MDM: CPT

## 2025-02-04 RX ORDER — TOBRAMYCIN 3 MG/ML
SOLUTION/ DROPS OPHTHALMIC
Qty: 5 ML | Refills: 0 | Status: SHIPPED | OUTPATIENT
Start: 2025-02-04

## 2025-02-04 ASSESSMENT — PAIN - FUNCTIONAL ASSESSMENT
PAIN_FUNCTIONAL_ASSESSMENT: PREVENTS OR INTERFERES SOME ACTIVE ACTIVITIES AND ADLS
PAIN_FUNCTIONAL_ASSESSMENT: 0-10

## 2025-02-04 ASSESSMENT — PAIN DESCRIPTION - LOCATION: LOCATION: EYE

## 2025-02-04 ASSESSMENT — PAIN DESCRIPTION - ORIENTATION: ORIENTATION: LEFT

## 2025-02-04 ASSESSMENT — PAIN DESCRIPTION - FREQUENCY: FREQUENCY: CONTINUOUS

## 2025-02-04 ASSESSMENT — PAIN SCALES - GENERAL: PAINLEVEL_OUTOF10: 8

## 2025-02-04 ASSESSMENT — PAIN DESCRIPTION - PAIN TYPE: TYPE: ACUTE PAIN

## 2025-02-04 ASSESSMENT — PAIN DESCRIPTION - DESCRIPTORS: DESCRIPTORS: DISCOMFORT

## 2025-02-04 NOTE — ED PROVIDER NOTES
HPI:  2/4/25,   Time: 9:18 AM DARRYN Garcia is a 39 y.o. male presenting to the ED for acute onset of left eye irritation that started at home after he been home from work.  He denies being around any flying debris or sawing or grinding activities.  He complains of a watery drainage from the eye.  He tried to go to work today but the eye was getting redder and continues to have this watery drainage.  He does not wear corrective lenses.        ROS:   Pertinent positives and negatives are stated within HPI, all other systems reviewed and are negative.  --------------------------------------------- PAST HISTORY ---------------------------------------------  Past Medical History:  has a past medical history of Diabetes mellitus (HCC).    Past Surgical History:  has no past surgical history on file.    Social History:  reports that he has been smoking cigars. He does not have any smokeless tobacco history on file. He reports current alcohol use. He reports that he does not use drugs.    Family History: family history is not on file.     The patient’s home medications have been reviewed.    Allergies: Patient has no known allergies.    -------------------------------------------------- RESULTS -------------------------------------------------  All laboratory and radiology results have been personally reviewed by myself   LABS:  No results found for this visit on 02/04/25.    RADIOLOGY:  Interpreted by Radiologist.  No orders to display       ------------------------- NURSING NOTES AND VITALS REVIEWED ---------------------------   The nursing notes within the ED encounter and vital signs as below have been reviewed.   BP (!) 143/101   Pulse 79   Temp 98.8 °F (37.1 °C) (Temporal)   Resp 16   SpO2 100%   Oxygen Saturation Interpretation: Normal      ---------------------------------------------------PHYSICAL EXAM--------------------------------------    Constitutional/General: Alert and oriented x3, well  Called patient regarding increase of the trulicity 1.5 mg to trulicity 3 mg new rx called in to pharmacy.   Patient understood